# Patient Record
Sex: FEMALE | URBAN - METROPOLITAN AREA
[De-identification: names, ages, dates, MRNs, and addresses within clinical notes are randomized per-mention and may not be internally consistent; named-entity substitution may affect disease eponyms.]

---

## 2023-05-16 ENCOUNTER — APPOINTMENT (RX ONLY)
Dept: URBAN - METROPOLITAN AREA CLINIC 114 | Facility: CLINIC | Age: 33
Setting detail: DERMATOLOGY
End: 2023-05-16

## 2023-05-16 DIAGNOSIS — Z41.9 ENCOUNTER FOR PROCEDURE FOR PURPOSES OTHER THAN REMEDYING HEALTH STATE, UNSPECIFIED: ICD-10-CM

## 2023-05-16 PROCEDURE — ? COSMETIC CONSULTATION: FILLERS

## 2023-05-16 PROCEDURE — ? MEDICAL CONSULTATION: DYSPORT

## 2023-05-16 PROCEDURE — ? ADDITIONAL NOTES

## 2023-05-16 PROCEDURE — ? DYSPORT

## 2023-05-16 NOTE — PROCEDURE: DYSPORT
L Brow Units: 0
Show Glabellar Units: Yes
Show Mentalis Units: No
Detail Level: Detailed
Lot #: 
Dilution (U/ 0.1cc): 10
Periorbital Skin Units: 60
Expiration Date (Month Year): 7/31/23
Price (Use Numbers Only, No Special Characters Or $): 306
Forehead Units: 30
Consent: Written consent obtained. Risks include but not limited to lid/brow ptosis, bruising, swelling, diplopia, temporary effect, incomplete chemical denervation.
Glabellar Complex Units: 60
Post-Care Instructions: Patient instructed to not lie down for 4 hours and limit physical activity for 24 hours.

## 2023-05-16 NOTE — PROCEDURE: ADDITIONAL NOTES
Detail Level: Simple
Additional Notes: .\\n.\\nDiscussed global volume restoration of temples, cheek hollows and periauricular area with sculptra as best option for pt. \\nShe understands this takes months to see results and results are natural looking \\n\\nFor lips we discussed rha filler for very natural appearance
Render Risk Assessment In Note?: no